# Patient Record
(demographics unavailable — no encounter records)

---

## 2024-10-22 NOTE — REVIEW OF SYSTEMS
[Fatigue] : no fatigue [Decreased Appetite] : appetite not decreased [Recent Weight Gain (___ Lbs)] : no recent weight gain [Recent Weight Loss (___ Lbs)] : no recent weight loss [Visual Field Defect] : no visual field defect [Dry Eyes] : no dryness [Dysphagia] : no dysphagia [Dysphonia] : no dysphonia [Chest Pain] : no chest pain [Palpitations] : no palpitations [Shortness Of Breath] : no shortness of breath [Nausea] : no nausea [Vomiting] : no vomiting [Polyuria] : no polyuria [Irregular Menses] : regular menses [Joint Pain] : no joint pain [Muscle Weakness] : no muscle weakness [Acanthosis] : no acanthosis  [Acne] : no acne [Headaches] : no headaches [Dizziness] : no dizziness [Tremors] : no tremors [Depression] : no depression [Polydipsia] : no polydipsia [Cold Intolerance] : no cold intolerance [Easy Bleeding] : no ~M tendency for easy bleeding [Easy Bruising] : no tendency for easy bruising

## 2024-10-22 NOTE — ASSESSMENT
[FreeTextEntry1] : 60 year old female here for evaluation of hypothyroidism.  Clinically mildly hypothyroid.   Hypothyroidism:  -Increase Levothyroxine 75 mcg x 7.5 tabs daily  -Clinically hypothyroid  -Recheck TFts in 8 weeks   Hyperlipidemia:  -LDL of 180 -Cutting back on fats and increasing exercise -Will recheck lipid panel in 8 weeks   Follow up in 6 months

## 2024-10-22 NOTE — HISTORY OF PRESENT ILLNESS
[FreeTextEntry1] : 60 year old female here for evaluation of Hashimoto's disease.   She is currently on Levothyroxine 75 mcg since the past 22 years ago.   Symptoms:  -Increased fatigue  -Increased hair loss  -No weight changes  -No constipation  -Increased cold intolerance    Hyperlipidemia:  LDL of 180  No FH of premature heart disease  No history of DM